# Patient Record
Sex: MALE | Race: ASIAN | NOT HISPANIC OR LATINO | URBAN - METROPOLITAN AREA
[De-identification: names, ages, dates, MRNs, and addresses within clinical notes are randomized per-mention and may not be internally consistent; named-entity substitution may affect disease eponyms.]

---

## 2022-01-01 ENCOUNTER — HOSPITAL ENCOUNTER (OUTPATIENT)
Facility: HOSPITAL | Age: 67
End: 2022-10-05
Attending: EMERGENCY MEDICINE | Admitting: FAMILY MEDICINE

## 2022-01-01 VITALS
BODY MASS INDEX: 22.1 KG/M2 | HEIGHT: 65 IN | HEART RATE: 83 BPM | DIASTOLIC BLOOD PRESSURE: 38 MMHG | SYSTOLIC BLOOD PRESSURE: 57 MMHG | WEIGHT: 132.63 LBS | RESPIRATION RATE: 31 BRPM | TEMPERATURE: 92 F | OXYGEN SATURATION: 89 %

## 2022-01-01 DIAGNOSIS — R07.9 CHEST PAIN: ICD-10-CM

## 2022-01-01 DIAGNOSIS — R73.9 HYPERGLYCEMIA, UNSPECIFIED: ICD-10-CM

## 2022-01-01 DIAGNOSIS — I21.3 STEMI (ST ELEVATION MYOCARDIAL INFARCTION): ICD-10-CM

## 2022-01-01 DIAGNOSIS — I21.4 NSTEMI (NON-ST ELEVATED MYOCARDIAL INFARCTION): Primary | ICD-10-CM

## 2022-01-01 DIAGNOSIS — J96.01 ACUTE RESPIRATORY FAILURE WITH HYPOXIA AND HYPERCARBIA: ICD-10-CM

## 2022-01-01 DIAGNOSIS — I50.21 ACUTE SYSTOLIC HEART FAILURE: ICD-10-CM

## 2022-01-01 DIAGNOSIS — I50.9 ACUTE CONGESTIVE HEART FAILURE, UNSPECIFIED HEART FAILURE TYPE: ICD-10-CM

## 2022-01-01 DIAGNOSIS — I46.9 CARDIAC ARREST: ICD-10-CM

## 2022-01-01 DIAGNOSIS — J96.02 ACUTE RESPIRATORY FAILURE WITH HYPOXIA AND HYPERCARBIA: ICD-10-CM

## 2022-01-01 LAB
ALBUMIN SERPL BCP-MCNC: 3.6 G/DL (ref 3.5–5.2)
ALLENS TEST: ABNORMAL
ALLENS TEST: ABNORMAL
ALP SERPL-CCNC: 83 U/L (ref 55–135)
ALT SERPL W/O P-5'-P-CCNC: 50 U/L (ref 10–44)
ANION GAP SERPL CALC-SCNC: 14 MMOL/L (ref 8–16)
APTT BLDCRRT: 139 SEC (ref 21–32)
AST SERPL-CCNC: 90 U/L (ref 10–40)
BACTERIA #/AREA URNS HPF: ABNORMAL /HPF
BASOPHILS # BLD AUTO: 0.04 K/UL (ref 0–0.2)
BASOPHILS # BLD AUTO: 0.04 K/UL (ref 0–0.2)
BASOPHILS NFR BLD: 0.3 % (ref 0–1.9)
BASOPHILS NFR BLD: 0.4 % (ref 0–1.9)
BILIRUB SERPL-MCNC: 0.2 MG/DL (ref 0.1–1)
BILIRUB UR QL STRIP: NEGATIVE
BNP SERPL-MCNC: 429 PG/ML (ref 0–99)
BUN SERPL-MCNC: 19 MG/DL (ref 8–23)
CALCIUM SERPL-MCNC: 8.8 MG/DL (ref 8.7–10.5)
CHLORIDE SERPL-SCNC: 104 MMOL/L (ref 95–110)
CHOLEST SERPL-MCNC: 178 MG/DL (ref 120–199)
CHOLEST/HDLC SERPL: 4.6 {RATIO} (ref 2–5)
CLARITY UR: ABNORMAL
CO2 SERPL-SCNC: 18 MMOL/L (ref 23–29)
COLOR UR: YELLOW
CREAT SERPL-MCNC: 0.8 MG/DL (ref 0.5–1.4)
CTP QC/QA: YES
DELSYS: ABNORMAL
DELSYS: ABNORMAL
DIFFERENTIAL METHOD: ABNORMAL
DIFFERENTIAL METHOD: ABNORMAL
EOSINOPHIL # BLD AUTO: 0 K/UL (ref 0–0.5)
EOSINOPHIL # BLD AUTO: 0.1 K/UL (ref 0–0.5)
EOSINOPHIL NFR BLD: 0.3 % (ref 0–8)
EOSINOPHIL NFR BLD: 0.8 % (ref 0–8)
ERYTHROCYTE [DISTWIDTH] IN BLOOD BY AUTOMATED COUNT: 13.7 % (ref 11.5–14.5)
ERYTHROCYTE [DISTWIDTH] IN BLOOD BY AUTOMATED COUNT: 13.8 % (ref 11.5–14.5)
ERYTHROCYTE [SEDIMENTATION RATE] IN BLOOD BY WESTERGREN METHOD: 18 MM/H
ERYTHROCYTE [SEDIMENTATION RATE] IN BLOOD BY WESTERGREN METHOD: 24 MM/H
EST. GFR  (NO RACE VARIABLE): >60 ML/MIN/1.73 M^2
FIO2: 100
FIO2: 75
GLUCOSE SERPL-MCNC: 188 MG/DL (ref 70–110)
GLUCOSE SERPL-MCNC: 352 MG/DL (ref 70–110)
GLUCOSE SERPL-MCNC: 445 MG/DL (ref 70–110)
GLUCOSE UR QL STRIP: ABNORMAL
HCO3 UR-SCNC: 10.1 MMOL/L (ref 24–28)
HCO3 UR-SCNC: 20.7 MMOL/L (ref 24–28)
HCT VFR BLD AUTO: 43.7 % (ref 40–54)
HCT VFR BLD AUTO: 44 % (ref 40–54)
HCT VFR BLD CALC: 39 %PCV (ref 36–54)
HCT VFR BLD CALC: 48 %PCV (ref 36–54)
HDLC SERPL-MCNC: 39 MG/DL (ref 40–75)
HDLC SERPL: 21.9 % (ref 20–50)
HGB BLD-MCNC: 14.5 G/DL (ref 14–18)
HGB BLD-MCNC: 14.7 G/DL (ref 14–18)
HGB UR QL STRIP: NEGATIVE
HYALINE CASTS #/AREA URNS LPF: 1 /LPF
IMM GRANULOCYTES # BLD AUTO: 0.05 K/UL (ref 0–0.04)
IMM GRANULOCYTES # BLD AUTO: 0.06 K/UL (ref 0–0.04)
IMM GRANULOCYTES NFR BLD AUTO: 0.5 % (ref 0–0.5)
IMM GRANULOCYTES NFR BLD AUTO: 0.5 % (ref 0–0.5)
INR PPP: 1.1 (ref 0.8–1.2)
KETONES UR QL STRIP: NEGATIVE
LDLC SERPL CALC-MCNC: 116.8 MG/DL (ref 63–159)
LEUKOCYTE ESTERASE UR QL STRIP: NEGATIVE
LYMPHOCYTES # BLD AUTO: 1.3 K/UL (ref 1–4.8)
LYMPHOCYTES # BLD AUTO: 1.6 K/UL (ref 1–4.8)
LYMPHOCYTES NFR BLD: 10.8 % (ref 18–48)
LYMPHOCYTES NFR BLD: 14 % (ref 18–48)
MCH RBC QN AUTO: 30.3 PG (ref 27–31)
MCH RBC QN AUTO: 30.4 PG (ref 27–31)
MCHC RBC AUTO-ENTMCNC: 33.2 G/DL (ref 32–36)
MCHC RBC AUTO-ENTMCNC: 33.4 G/DL (ref 32–36)
MCV RBC AUTO: 91 FL (ref 82–98)
MCV RBC AUTO: 91 FL (ref 82–98)
MICROSCOPIC COMMENT: ABNORMAL
MODE: ABNORMAL
MODE: ABNORMAL
MONOCYTES # BLD AUTO: 0.3 K/UL (ref 0.3–1)
MONOCYTES # BLD AUTO: 0.4 K/UL (ref 0.3–1)
MONOCYTES NFR BLD: 2.6 % (ref 4–15)
MONOCYTES NFR BLD: 3.4 % (ref 4–15)
NEUTROPHILS # BLD AUTO: 10.4 K/UL (ref 1.8–7.7)
NEUTROPHILS # BLD AUTO: 8.9 K/UL (ref 1.8–7.7)
NEUTROPHILS NFR BLD: 80.9 % (ref 38–73)
NEUTROPHILS NFR BLD: 85.5 % (ref 38–73)
NITRITE UR QL STRIP: NEGATIVE
NONHDLC SERPL-MCNC: 139 MG/DL
NRBC BLD-RTO: 0 /100 WBC
NRBC BLD-RTO: 0 /100 WBC
PCO2 BLDA: 65.3 MMHG (ref 35–45)
PCO2 BLDA: 65.5 MMHG (ref 35–45)
PEEP: 5
PEEP: 5
PH SMN: 6.8 [PH] (ref 7.35–7.45)
PH SMN: 7.11 [PH] (ref 7.35–7.45)
PH UR STRIP: 6 [PH] (ref 5–8)
PLATELET # BLD AUTO: 189 K/UL (ref 150–450)
PLATELET # BLD AUTO: 216 K/UL (ref 150–450)
PMV BLD AUTO: 10 FL (ref 9.2–12.9)
PMV BLD AUTO: 10.3 FL (ref 9.2–12.9)
PO2 BLDA: 79 MMHG (ref 80–100)
PO2 BLDA: 87 MMHG (ref 80–100)
POC BE: -24 MMOL/L
POC BE: -9 MMOL/L
POC IONIZED CALCIUM: 0.97 MMOL/L (ref 1.06–1.42)
POC IONIZED CALCIUM: 1.25 MMOL/L (ref 1.06–1.42)
POC SATURATED O2: 82 % (ref 95–100)
POC SATURATED O2: 89 % (ref 95–100)
POTASSIUM BLD-SCNC: 3.7 MMOL/L (ref 3.5–5.1)
POTASSIUM BLD-SCNC: 4.3 MMOL/L (ref 3.5–5.1)
POTASSIUM SERPL-SCNC: 3.3 MMOL/L (ref 3.5–5.1)
PROT SERPL-MCNC: 7.3 G/DL (ref 6–8.4)
PROT UR QL STRIP: ABNORMAL
PROTHROMBIN TIME: 11.9 SEC (ref 9–12.5)
RBC # BLD AUTO: 4.79 M/UL (ref 4.6–6.2)
RBC # BLD AUTO: 4.84 M/UL (ref 4.6–6.2)
RBC #/AREA URNS HPF: 21 /HPF (ref 0–4)
SAMPLE: ABNORMAL
SAMPLE: ABNORMAL
SARS-COV-2 RDRP RESP QL NAA+PROBE: NEGATIVE
SITE: ABNORMAL
SITE: ABNORMAL
SODIUM BLD-SCNC: 135 MMOL/L (ref 136–145)
SODIUM BLD-SCNC: 145 MMOL/L (ref 136–145)
SODIUM SERPL-SCNC: 136 MMOL/L (ref 136–145)
SP GR UR STRIP: 1.02 (ref 1–1.03)
TRIGL SERPL-MCNC: 111 MG/DL (ref 30–150)
TROPONIN I SERPL DL<=0.01 NG/ML-MCNC: 0.59 NG/ML (ref 0–0.03)
UNIDENT CRYS URNS QL MICRO: ABNORMAL
URN SPEC COLLECT METH UR: ABNORMAL
UROBILINOGEN UR STRIP-ACNC: ABNORMAL EU/DL
VT: 400
VT: 400
WBC # BLD AUTO: 11.04 K/UL (ref 3.9–12.7)
WBC # BLD AUTO: 12.17 K/UL (ref 3.9–12.7)
WBC #/AREA URNS HPF: 5 /HPF (ref 0–5)

## 2022-01-01 PROCEDURE — 63600175 PHARM REV CODE 636 W HCPCS: Performed by: NURSE PRACTITIONER

## 2022-01-01 PROCEDURE — 36620 INSERTION CATHETER ARTERY: CPT | Mod: 59,,, | Performed by: NURSE PRACTITIONER

## 2022-01-01 PROCEDURE — 81000 URINALYSIS NONAUTO W/SCOPE: CPT | Performed by: FAMILY MEDICINE

## 2022-01-01 PROCEDURE — 85610 PROTHROMBIN TIME: CPT | Performed by: EMERGENCY MEDICINE

## 2022-01-01 PROCEDURE — 84484 ASSAY OF TROPONIN QUANT: CPT | Performed by: EMERGENCY MEDICINE

## 2022-01-01 PROCEDURE — 27100108

## 2022-01-01 PROCEDURE — 99291 PR CRITICAL CARE, E/M 30-74 MINUTES: ICD-10-PCS | Mod: 25,,, | Performed by: NURSE PRACTITIONER

## 2022-01-01 PROCEDURE — 93010 EKG 12-LEAD: ICD-10-PCS | Mod: 76,,, | Performed by: INTERNAL MEDICINE

## 2022-01-01 PROCEDURE — 99291 CRITICAL CARE FIRST HOUR: CPT | Mod: 25,,, | Performed by: NURSE PRACTITIONER

## 2022-01-01 PROCEDURE — 83880 ASSAY OF NATRIURETIC PEPTIDE: CPT | Performed by: EMERGENCY MEDICINE

## 2022-01-01 PROCEDURE — 36415 COLL VENOUS BLD VENIPUNCTURE: CPT | Performed by: EMERGENCY MEDICINE

## 2022-01-01 PROCEDURE — 99499 NO LOS: ICD-10-PCS | Mod: ,,, | Performed by: INTERNAL MEDICINE

## 2022-01-01 PROCEDURE — 82803 BLOOD GASES ANY COMBINATION: CPT

## 2022-01-01 PROCEDURE — 51702 INSERT TEMP BLADDER CATH: CPT

## 2022-01-01 PROCEDURE — 63600175 PHARM REV CODE 636 W HCPCS: Performed by: FAMILY MEDICINE

## 2022-01-01 PROCEDURE — 80061 LIPID PANEL: CPT | Performed by: EMERGENCY MEDICINE

## 2022-01-01 PROCEDURE — 25000003 PHARM REV CODE 250: Performed by: FAMILY MEDICINE

## 2022-01-01 PROCEDURE — 25000003 PHARM REV CODE 250

## 2022-01-01 PROCEDURE — 93010 ELECTROCARDIOGRAM REPORT: CPT | Mod: ,,, | Performed by: INTERNAL MEDICINE

## 2022-01-01 PROCEDURE — 27200966 HC CLOSED SUCTION SYSTEM

## 2022-01-01 PROCEDURE — 87635 SARS-COV-2 COVID-19 AMP PRB: CPT | Performed by: FAMILY MEDICINE

## 2022-01-01 PROCEDURE — 36556 INSERT NON-TUNNEL CV CATH: CPT | Mod: ,,, | Performed by: NURSE PRACTITIONER

## 2022-01-01 PROCEDURE — G0378 HOSPITAL OBSERVATION PER HR: HCPCS

## 2022-01-01 PROCEDURE — 63600175 PHARM REV CODE 636 W HCPCS: Performed by: EMERGENCY MEDICINE

## 2022-01-01 PROCEDURE — 25000003 PHARM REV CODE 250: Performed by: NURSE PRACTITIONER

## 2022-01-01 PROCEDURE — 99499 UNLISTED E&M SERVICE: CPT | Mod: ,,, | Performed by: INTERNAL MEDICINE

## 2022-01-01 PROCEDURE — 25000003 PHARM REV CODE 250: Performed by: EMERGENCY MEDICINE

## 2022-01-01 PROCEDURE — 80053 COMPREHEN METABOLIC PANEL: CPT | Performed by: EMERGENCY MEDICINE

## 2022-01-01 PROCEDURE — 94002 VENT MGMT INPAT INIT DAY: CPT

## 2022-01-01 PROCEDURE — 36620 PR INSERT CATH,ART,PERCUT,SHORTTERM: ICD-10-PCS | Mod: 59,,, | Performed by: NURSE PRACTITIONER

## 2022-01-01 PROCEDURE — 36556 PR INSERT NON-TUNNEL CV CATH 5+ YRS OLD: ICD-10-PCS | Mod: ,,, | Performed by: NURSE PRACTITIONER

## 2022-01-01 PROCEDURE — 92950 HEART/LUNG RESUSCITATION CPR: CPT

## 2022-01-01 PROCEDURE — 96366 THER/PROPH/DIAG IV INF ADDON: CPT

## 2022-01-01 PROCEDURE — 99900035 HC TECH TIME PER 15 MIN (STAT)

## 2022-01-01 PROCEDURE — 36600 WITHDRAWAL OF ARTERIAL BLOOD: CPT

## 2022-01-01 PROCEDURE — 85025 COMPLETE CBC W/AUTO DIFF WBC: CPT | Mod: 91 | Performed by: EMERGENCY MEDICINE

## 2022-01-01 PROCEDURE — 93005 ELECTROCARDIOGRAM TRACING: CPT

## 2022-01-01 PROCEDURE — 85730 THROMBOPLASTIN TIME PARTIAL: CPT | Performed by: EMERGENCY MEDICINE

## 2022-01-01 PROCEDURE — 99291 CRITICAL CARE FIRST HOUR: CPT | Mod: 25

## 2022-01-01 PROCEDURE — 96365 THER/PROPH/DIAG IV INF INIT: CPT | Mod: 59

## 2022-01-01 PROCEDURE — 96374 THER/PROPH/DIAG INJ IV PUSH: CPT

## 2022-01-01 PROCEDURE — 27100080 HC AIRWAY ADAPTER-END TIDAL CO2

## 2022-01-01 PROCEDURE — 93010 ELECTROCARDIOGRAM REPORT: CPT | Mod: 76,,, | Performed by: INTERNAL MEDICINE

## 2022-01-01 PROCEDURE — 31500 INSERT EMERGENCY AIRWAY: CPT

## 2022-01-01 RX ORDER — FAMOTIDINE 10 MG/ML
20 INJECTION INTRAVENOUS 2 TIMES DAILY
Status: DISCONTINUED | OUTPATIENT
Start: 2022-01-01 | End: 2022-01-01 | Stop reason: HOSPADM

## 2022-01-01 RX ORDER — FUROSEMIDE 10 MG/ML
20 INJECTION INTRAMUSCULAR; INTRAVENOUS ONCE
Status: DISCONTINUED | OUTPATIENT
Start: 2022-01-01 | End: 2022-01-01 | Stop reason: HOSPADM

## 2022-01-01 RX ORDER — NOREPINEPHRINE BITARTRATE/D5W 4MG/250ML
PLASTIC BAG, INJECTION (ML) INTRAVENOUS
Status: DISCONTINUED
Start: 2022-01-01 | End: 2022-01-01 | Stop reason: HOSPADM

## 2022-01-01 RX ORDER — EPINEPHRINE 0.1 MG/ML
INJECTION INTRAVENOUS CODE/TRAUMA/SEDATION MEDICATION
Status: COMPLETED | OUTPATIENT
Start: 2022-01-01 | End: 2022-01-01

## 2022-01-01 RX ORDER — POTASSIUM CHLORIDE 20 MEQ/1
40 TABLET, EXTENDED RELEASE ORAL ONCE
Status: DISCONTINUED | OUTPATIENT
Start: 2022-01-01 | End: 2022-01-01 | Stop reason: HOSPADM

## 2022-01-01 RX ORDER — MORPHINE SULFATE 4 MG/ML
2 INJECTION, SOLUTION INTRAMUSCULAR; INTRAVENOUS EVERY 6 HOURS PRN
Status: DISCONTINUED | OUTPATIENT
Start: 2022-01-01 | End: 2022-01-01 | Stop reason: HOSPADM

## 2022-01-01 RX ORDER — INDOMETHACIN 25 MG/1
CAPSULE ORAL
Status: COMPLETED
Start: 2022-01-01 | End: 2022-01-01

## 2022-01-01 RX ORDER — EPINEPHRINE 0.1 MG/ML
INJECTION INTRAVENOUS CODE/TRAUMA/SEDATION MEDICATION
Status: DISCONTINUED | OUTPATIENT
Start: 2022-01-01 | End: 2022-01-01 | Stop reason: HOSPADM

## 2022-01-01 RX ORDER — AMIODARONE HYDROCHLORIDE 150 MG/3ML
INJECTION, SOLUTION INTRAVENOUS CODE/TRAUMA/SEDATION MEDICATION
Status: DISCONTINUED | OUTPATIENT
Start: 2022-01-01 | End: 2022-01-01 | Stop reason: HOSPADM

## 2022-01-01 RX ORDER — HEPARIN SODIUM 5000 [USP'U]/ML
5000 INJECTION, SOLUTION INTRAVENOUS; SUBCUTANEOUS
Status: COMPLETED | OUTPATIENT
Start: 2022-01-01 | End: 2022-01-01

## 2022-01-01 RX ORDER — INDOMETHACIN 25 MG/1
100 CAPSULE ORAL ONCE
Status: COMPLETED | OUTPATIENT
Start: 2022-01-01 | End: 2022-01-01

## 2022-01-01 RX ORDER — HEPARIN SODIUM,PORCINE/D5W 25000/250
0-40 INTRAVENOUS SOLUTION INTRAVENOUS CONTINUOUS
Status: DISCONTINUED | OUTPATIENT
Start: 2022-01-01 | End: 2022-01-01 | Stop reason: HOSPADM

## 2022-01-01 RX ORDER — SODIUM CHLORIDE 0.9 % (FLUSH) 0.9 %
10 SYRINGE (ML) INJECTION
Status: DISCONTINUED | OUTPATIENT
Start: 2022-01-01 | End: 2022-01-01 | Stop reason: HOSPADM

## 2022-01-01 RX ORDER — ONDANSETRON 4 MG/1
4 TABLET, FILM COATED ORAL EVERY 6 HOURS PRN
Status: DISCONTINUED | OUTPATIENT
Start: 2022-01-01 | End: 2022-01-01 | Stop reason: HOSPADM

## 2022-01-01 RX ORDER — PROPOFOL 10 MG/ML
0-50 INJECTION, EMULSION INTRAVENOUS CONTINUOUS
Status: DISCONTINUED | OUTPATIENT
Start: 2022-01-01 | End: 2022-01-01 | Stop reason: HOSPADM

## 2022-01-01 RX ORDER — ASPIRIN 325 MG
325 TABLET ORAL
Status: COMPLETED | OUTPATIENT
Start: 2022-01-01 | End: 2022-01-01

## 2022-01-01 RX ORDER — SODIUM BICARBONATE 1 MEQ/ML
SYRINGE (ML) INTRAVENOUS CODE/TRAUMA/SEDATION MEDICATION
Status: COMPLETED | OUTPATIENT
Start: 2022-01-01 | End: 2022-01-01

## 2022-01-01 RX ORDER — ACETAMINOPHEN 325 MG/1
650 TABLET ORAL EVERY 6 HOURS PRN
Status: DISCONTINUED | OUTPATIENT
Start: 2022-01-01 | End: 2022-01-01 | Stop reason: HOSPADM

## 2022-01-01 RX ORDER — SODIUM BICARBONATE 1 MEQ/ML
SYRINGE (ML) INTRAVENOUS CODE/TRAUMA/SEDATION MEDICATION
Status: DISCONTINUED | OUTPATIENT
Start: 2022-01-01 | End: 2022-01-01 | Stop reason: HOSPADM

## 2022-01-01 RX ORDER — NOREPINEPHRINE BITARTRATE/D5W 4MG/250ML
0-3 PLASTIC BAG, INJECTION (ML) INTRAVENOUS CONTINUOUS
Status: DISCONTINUED | OUTPATIENT
Start: 2022-01-01 | End: 2022-01-01 | Stop reason: HOSPADM

## 2022-01-01 RX ORDER — CHLORHEXIDINE GLUCONATE ORAL RINSE 1.2 MG/ML
15 SOLUTION DENTAL 2 TIMES DAILY
Status: DISCONTINUED | OUTPATIENT
Start: 2022-01-01 | End: 2022-01-01 | Stop reason: HOSPADM

## 2022-01-01 RX ORDER — MORPHINE SULFATE 4 MG/ML
2 INJECTION, SOLUTION INTRAMUSCULAR; INTRAVENOUS
Status: COMPLETED | OUTPATIENT
Start: 2022-01-01 | End: 2022-01-01

## 2022-01-01 RX ADMIN — EPINEPHRINE 1 MG: 0.1 INJECTION, SOLUTION ENDOTRACHEAL; INTRACARDIAC; INTRAVENOUS at 06:10

## 2022-01-01 RX ADMIN — SODIUM BICARBONATE 50 MEQ: 84 INJECTION, SOLUTION INTRAVENOUS at 07:10

## 2022-01-01 RX ADMIN — INDOMETHACIN 100 MEQ: 25 CAPSULE ORAL at 06:10

## 2022-01-01 RX ADMIN — SODIUM CHLORIDE 1000 ML: 0.9 INJECTION, SOLUTION INTRAVENOUS at 06:10

## 2022-01-01 RX ADMIN — SODIUM BICARBONATE 100 MEQ: 84 INJECTION, SOLUTION INTRAVENOUS at 07:10

## 2022-01-01 RX ADMIN — HEPARIN SODIUM AND DEXTROSE 12 UNITS/KG/HR: 10000; 5 INJECTION INTRAVENOUS at 03:10

## 2022-01-01 RX ADMIN — EPINEPHRINE 0.1 MG: 0.1 INJECTION, SOLUTION ENDOTRACHEAL; INTRACARDIAC; INTRAVENOUS at 05:10

## 2022-01-01 RX ADMIN — PROPOFOL 5 MCG/KG/MIN: 10 INJECTION, EMULSION INTRAVENOUS at 06:10

## 2022-01-01 RX ADMIN — INDOMETHACIN 100 MEQ: 25 CAPSULE ORAL at 07:10

## 2022-01-01 RX ADMIN — EPINEPHRINE 1 MG: 0.1 INJECTION, SOLUTION ENDOTRACHEAL; INTRACARDIAC; INTRAVENOUS at 07:10

## 2022-01-01 RX ADMIN — MORPHINE SULFATE 2 MG: 4 INJECTION INTRAVENOUS at 02:10

## 2022-01-01 RX ADMIN — SODIUM BICARBONATE 100 MEQ: 84 INJECTION, SOLUTION INTRAVENOUS at 06:10

## 2022-01-01 RX ADMIN — HEPARIN SODIUM 5000 UNITS: 5000 INJECTION, SOLUTION INTRAVENOUS; SUBCUTANEOUS at 02:10

## 2022-01-01 RX ADMIN — ASPIRIN 325 MG ORAL TABLET 325 MG: 325 PILL ORAL at 02:10

## 2022-01-01 RX ADMIN — INSULIN HUMAN 10 UNITS: 100 INJECTION, SOLUTION PARENTERAL at 07:10

## 2022-01-01 RX ADMIN — HEPARIN SODIUM AND DEXTROSE 12 UNITS/KG/HR: 10000; 5 INJECTION INTRAVENOUS at 06:10

## 2022-01-01 RX ADMIN — SODIUM BICARBONATE 50 MEQ: 84 INJECTION, SOLUTION INTRAVENOUS at 06:10

## 2022-01-01 RX ADMIN — AMIODARONE HYDROCHLORIDE 150 MG: 150 INJECTION, SOLUTION INTRAVENOUS at 07:10

## 2022-10-05 PROBLEM — I46.9 CARDIAC ARREST: Status: ACTIVE | Noted: 2022-01-01

## 2022-10-05 PROBLEM — R73.9 HYPERGLYCEMIA, UNSPECIFIED: Status: ACTIVE | Noted: 2022-01-01

## 2022-10-05 PROBLEM — E87.6 HYPOKALEMIA: Status: ACTIVE | Noted: 2022-01-01

## 2022-10-05 PROBLEM — J96.02 ACUTE RESPIRATORY FAILURE WITH HYPOXIA AND HYPERCARBIA: Status: ACTIVE | Noted: 2022-01-01

## 2022-10-05 PROBLEM — I50.21 ACUTE SYSTOLIC HEART FAILURE: Status: ACTIVE | Noted: 2022-01-01

## 2022-10-05 PROBLEM — J96.01 ACUTE RESPIRATORY FAILURE WITH HYPOXIA AND HYPERCARBIA: Status: ACTIVE | Noted: 2022-01-01

## 2022-10-05 PROBLEM — I21.4 NSTEMI (NON-ST ELEVATED MYOCARDIAL INFARCTION): Status: ACTIVE | Noted: 2022-01-01

## 2022-10-05 PROBLEM — R79.89 ELEVATED BRAIN NATRIURETIC PEPTIDE (BNP) LEVEL: Status: ACTIVE | Noted: 2022-01-01

## 2022-10-05 NOTE — HOSPITAL COURSE
Patient was admitted for nstemi. He subsequently went into cardiac arrest on the floor. Patient was intubated and transferred to ICU. Patient was coded for approximately 45-50 min. He had multiple episodes of ROSC however eventually became unresponsive to epi pushes. Unable to contact family. A friend called patient's phone who stated that patient is  however does not know her name or number. Patient does not have any children. Due to futility, decision was made to not escalate care further.     Patient was examined at bedside. No spontaneous breath/cardiac sounds auscultated. Pupils fixed and dilated. He did not withdraw to pain. PEA on monitor. Patient was pronounced at 0737 on 10/5/2022.

## 2022-10-05 NOTE — PROCEDURES
"Morgan Alanis Do is a 66 y.o. male patient.    Temp: (!) 92.3 °F (33.5 °C) (10/05/22 0724)  Pulse: 83 (10/05/22 0724)  Resp: (!) 31 (10/05/22 0724)  BP: (!) 57/38 (10/05/22 0724)  SpO2: (!) 89 % (10/05/22 0724)  Weight: 60.1 kg (132 lb 9.6 oz) (10/05/22 0254)  Height: 5' 5" (165.1 cm) (10/05/22 0254)       Arterial Line    Date/Time: 10/5/2022 6:50 AM  Location procedure was performed: [facility] INTENSIVE CARE UNIT  Performed by: Luciano Newell NP  Authorized by: Luciano Newell NP   Pre-op Diagnosis: cardiac arrest  Post-operative diagnosis: cardiac arrest  Consent Done: Emergent Situation  Preparation: Patient was prepped and draped in the usual sterile fashion.  Indications: multiple ABGs, respiratory failure and hemodynamic monitoring  Location: right femoral    Patient sedated: no  Needle gauge: 5F.  Seldinger technique: Seldinger technique used  Number of attempts: 1  Complications: No  Estimated blood loss (mL): 1  Specimens: No  Implants: No  Post-procedure: line sutured and dressing applied  Post-procedure CMS: unchanged  Patient tolerance: Patient tolerated the procedure well with no immediate complications        10/5/2022    "

## 2022-10-05 NOTE — CONSULTS
[facility] - Intensive Care ([facility])  Critical Care Medicine  Consult Note    Patient Name: Morgan Alanis Do  MRN: [id number]  Admission Date: 10/5/2022  Hospital Length of Stay: 0 days  Code Status: Full Code  Attending Physician: Giovanny Berumen MD   Primary Care Provider: No primary care provider on file.   Principal Problem: NSTEMI (non-ST elevated myocardial infarction)    [unfilled]  Subjective:     HPI:  Mr Lynch is a Turkish male age 65 yo with unknown PMH not listed on any home meds presented to [facility] ED about 0250 hr this AM via EMS with complaint of left sided CP which onset suddenly around 10:00PM prior evening. Associated sxs included SOB, weakness, and fatigue. Patient denied any n/v/d, fever, numbness, cough, and all other sxs at this time.  Hypothermic in ED with troponin 0.6 and  with pulm edema on CXR and admitted to Tele for NSTEMI.  Cards consulted started on Heparin infusion.  On Tele had cardiac arrest and intubated then transferred to ICU post ROSC about 0600 hr this AM.      Hospital/ICU Course:  10/5 - I arrived this AM to work at 0630 and at that time patient had another Cardiac arrest going in and out of ROSC with repeated arrest refractory to CPR, Epi X 8, Bicarb X 3 and Amiodarone X 1.  He had short runs of V-Tach with NSR but would not support BP with Levophed infusion, IVF bolus and multiple Epi Inj.  Bedside cardiac Echo per myself revealed severe WMA.  Code run by myself and Dr. Berumen then Dr. Owen arrived about 0650 and assisted with repeated codes lasting 45 minutes but unable to obtain SBP > 40 with ACLS meds.  Emergent right femoral Arterial and CL placed see procedure notes.  Code was eventually called at 0714 per Dr. Owen and Dr. Berumen.      History reviewed. No pertinent past medical history.    History reviewed. No pertinent surgical history.    Review of patient's allergies indicates:  No Known Allergies    Family History    None       Tobacco Use    Smoking status: Not on file     Smokeless tobacco: Not on file   Substance and Sexual Activity    Alcohol use: Not on file    Drug use: Not on file    Sexual activity: Not on file         Review of Systems   Unable to perform ROS: Intubated   Objective:     Vital Signs (Most Recent):  Temp: (!) 92.3 °F (33.5 °C) (10/05/22 0724)  Pulse: 83 (10/05/22 0724)  Resp: (!) 31 (10/05/22 0724)  BP: (!) 57/38 (10/05/22 0724)  SpO2: (!) 89 % (10/05/22 0724)   Vital Signs (24h Range):  Temp:  [92.3 °F (33.5 °C)-98.3 °F (36.8 °C)] 92.3 °F (33.5 °C)  Pulse:  [] 83  Resp:  [18-40] 31  SpO2:  [82 %-100 %] 89 %  BP: ()/(38-99) 57/38  Arterial Line BP: (92)/(77) 92/77     Weight: 60.1 kg (132 lb 9.6 oz)  Body mass index is 22.07 kg/m².    No intake or output data in the 24 hours ending 10/05/22 0817    Physical Exam  Vitals and nursing note reviewed.   Constitutional:       General: He is not in acute distress.     Appearance: He is well-developed and normal weight. He is ill-appearing and toxic-appearing. He is not diaphoretic.      Interventions: He is intubated and restrained.   HENT:      Head: Normocephalic and atraumatic.      Mouth/Throat:      Mouth: Mucous membranes are moist.   Eyes:      General: Lids are normal.      Pupils:      Right eye: Pupil is sluggish.      Left eye: Pupil is sluggish.   Neck:      Trachea: Trachea normal.   Cardiovascular:      Rate and Rhythm: Normal rate. Rhythm irregular.      Pulses:           Radial pulses are 0 on the right side and 0 on the left side.        Dorsalis pedis pulses are 0 on the right side and 0 on the left side.      Heart sounds: Heart sounds are distant. Murmur heard.   Systolic murmur is present.   Pulmonary:      Effort: Pulmonary effort is normal. No tachypnea, accessory muscle usage or respiratory distress. He is intubated.      Breath sounds: Decreased breath sounds and rales present.   Chest:      Chest wall: No deformity.   Abdominal:      General: Bowel sounds are absent. There is no  distension.      Palpations: Abdomen is soft.      Tenderness: There is no abdominal tenderness.   Genitourinary:     Penis: Normal.       Comments: Donovan in place  Musculoskeletal:         General: Normal range of motion.      Cervical back: Neck supple.      Right lower leg: No edema.      Left lower leg: No edema.      Right foot: No deformity.      Left foot: No deformity.   Lymphadenopathy:      Cervical: No cervical adenopathy.   Skin:     General: Skin is warm and dry.      Capillary Refill: Capillary refill takes more than 3 seconds.      Findings: No rash.          Neurological:      Comments: Comatose with cardiac arrest       Vents:  Vent Mode: A/C (10/05/22 0724)  Ventilator Initiated: Yes (10/05/22 0545)  Set Rate: 30 BPM (10/05/22 0724)  Vt Set: 430 mL (10/05/22 0724)  PEEP/CPAP: 5 cmH20 (10/05/22 0724)  Oxygen Concentration (%): 100 (10/05/22 0655)  Peak Airway Pressure: 20 cmH2O (10/05/22 0724)  Total Ve: 11.4 mL (10/05/22 0724)  F/VT Ratio<105 (RSBI): (!) 81.15 (10/05/22 0724)    Lines/Drains/Airways       Drain  Duration                  NG/OG Tube 10/05/22 0608 Center mouth <1 day         Urethral Catheter 10/05/22 0607 <1 day              Airway  Duration                  Airway - Non-Surgical 10/05/22 0545 Endotracheal Tube <1 day              Peripheral Intravenous Line  Duration                  Peripheral IV - Single Lumen 10/05/22 0253 18 G Left Antecubital <1 day         Peripheral IV - Single Lumen 10/05/22 0253 18 G Right Antecubital <1 day                    Significant Labs:    CBC/Anemia Profile:  Recent Labs   Lab 10/05/22  0247 10/05/22  0441 10/05/22  0604 10/05/22  0652   WBC 11.04 12.17  --   --    HGB 14.5 14.7  --   --    HCT 43.7 44.0 48 39    216  --   --    MCV 91 91  --   --    RDW 13.7 13.8  --   --         Chemistries:  Recent Labs   Lab 10/05/22  0247      K 3.3*      CO2 18*   BUN 19   CREATININE 0.8   ALBUMIN 3.6   BILITOT 0.2   ALKPHOS 83   ALT 50*    AST 90*       ABGs:   Recent Labs   Lab 10/05/22  0652   PH 7.107*   PCO2 65.5*   HCO3 20.7*   POCSATURATED 89*   BE -9     Troponin:   Recent Labs   Lab 10/05/22  0247   TROPONINI 0.588*     Urine Studies:   Recent Labs   Lab 10/05/22  0610   COLORU Yellow   APPEARANCEUA Hazy*   PHUR 6.0   SPECGRAV 1.025   PROTEINUA 3+*   GLUCUA Trace*   KETONESU Negative   BILIRUBINUA Negative   OCCULTUA Negative   NITRITE Negative   UROBILINOGEN 2.0-3.0*   LEUKOCYTESUR Negative   RBCUA 21*   WBCUA 5   BACTERIA Moderate*   HYALINECASTS 1     All pertinent labs within the past 24 hours have been reviewed.    Significant Imaging:   I have reviewed all pertinent imaging results/findings within the past 24 hours.  CXR: I have reviewed all pertinent results/findings within the past 24 hours and my personal findings are:  diffuse bilat pulm edema      ABG  Recent Labs   Lab 10/05/22  0652   PH 7.107*   PO2 79*   PCO2 65.5*   HCO3 20.7*   BE -9     Assessment/Plan:     Problem   Cardiac Arrest   Nstemi (Non-St Elevated Myocardial Infarction)   Acute Respiratory Failure With Hypoxia and Hypercarbia   Hyperglycemia, Unspecified   Acute Systolic Heart Failure   Hypokalemia     PLAN:   I arrived this AM to work at 0630 and at that time patient had another Cardiac arrest going in and out of ROSC with repeated arrest refractory to CPR, Epi X 8, Bicarb X 3 and Amiodarone X 1.  He had short runs of V-Tach with NSR but would not support BP with Levophed infusion, IVF bolus and multiple Epi Inj.  Bedside cardiac Echo per myself revealed severe WMA.  Code run by myself and Dr. Berumen then Dr. Owen arrived about 0650 and assisted with repeated codes lasting 45 minutes but unable to obtain SBP > 40 with ACLS meds.  Emergent right femoral Arterial and CL placed see procedure notes.  Code was eventually called at 0714 per Dr. Owen and Dr. Berumen.      Critical Care Time: 45 minutes  Critical secondary to Patient has a condition that poses threat to life  and bodily function: Acute Myocardial Infarction and Cardiac Arrest and Resp Failure intubated on [facility] ventilation  Patient is currently on drug therapy requiring intensive monitoring for toxicity: Levophed infusion and ACLS meds of multiple Epi injections     Critical care was time spent personally by me on the following activities: development of treatment plan with patient or surrogate and bedside caregivers, discussions with consultants, evaluation of patient's response to treatment, examination of patient, ordering and performing treatments and interventions, ordering and review of laboratory studies, ordering and review of radiographic studies, pulse oximetry, re-evaluation of patient's condition. This critical care time did not overlap with that of any other provider or involve time for any procedures.    Thank you for your consult. I will sign off. Please contact us if you have any additional questions.     Luciano Newell NP  Critical Care Medicine  '[facility] - Intensive Care [facility])

## 2022-10-05 NOTE — DISCHARGE SUMMARY
[facility] - Intensive Care ([facility])  [facility] Medicine  Discharge Summary      Patient Name: Morgan Alanis Do  MRN: [id number]  Patient Class: OP- Observation  Admission Date: 10/5/2022  Hospital Length of Stay: 0 days  Discharge Date and Time: TOD 0737 on 10/5/2022  Attending Physician: Giovanny Berumen MD   Discharging Provider: Giovanny Berumen MD  Primary Care Provider: No primary care provider on file.      HPI:   Patient is a 66 y.o. Ukrainian male with no reported PMH who presents to the Emergency Department for evaluation of left sided CP which onset suddenly around 10:00PM.  Patient's that the pain is substernal and constant without any alleviating or exacerbating factors.  Associated symptoms include shortness of breath and generalized fatigue.  Denies ever having similar symptoms in the past.  Denies any other issues.    In the ED, code STEMI was initial call.  Cardiology evaluated EKG and stated that it was not a STEMI.  Troponin 0.58.  .  Heparin drip was recommended.  Chest x-ray was concerning for pulmonary edema.  Patient was placed in observation for NSTEMI.      Procedure(s) (LRB):  CATHETERIZATION, HEART, LEFT (Left)      Hospital Course:   Patient was admitted for nstemi. He subsequently went into cardiac arrest on the floor. Patient was intubated and transferred to ICU. Patient was coded for approximately 45-50 min. He had multiple episodes of ROSC however eventually became unresponsive to epi pushes. Unable to contact family. A friend called patient's phone who stated that patient is  however does not know her name or number. Patient does not have any children. Due to futility, decision was made to not escalate care further.     Patient was examined at bedside. No spontaneous breath/cardiac sounds auscultated. Pupils fixed and dilated. He did not withdraw to pain. PEA on monitor. Patient was pronounced at 0737 on 10/5/2022.              Goals of Care Treatment Preferences:  Code Status: Full  Code      Consults:   Consults (From admission, onward)        Status Ordering Provider     Inpatient consult to Registered Dietitian/Nutritionist  Once        Provider:  (Not yet assigned)    Ordered ANA BERUMEN     Inpatient consult to Cardiology  Once        Provider:  (Not yet assigned)    Acknowledged ANA BERUMEN     Inpatient consult to Hospitalist  Once        Provider:  Ana Berumen MD    Acknowledged CESAR BALDERRAMA          No new Assessment & Plan notes have been filed under this hospital service since the last note was generated.  Service: Hospital Medicine    Final Active Diagnoses:    Diagnosis Date Noted POA    PRINCIPAL PROBLEM:  NSTEMI (non-ST elevated myocardial infarction) [I21.4] 10/05/2022 Yes    Elevated brain natriuretic peptide (BNP) level [R79.89] 10/05/2022 Yes    Hypokalemia [E87.6] 10/05/2022 Yes      Problems Resolved During this Admission:       Discharged Condition:     Disposition:     Follow Up:    Patient Instructions:   No discharge procedures on file.    Significant Diagnostic Studies:     Pending Diagnostic Studies:     Procedure Component Value Units Date/Time    Echo Saline Bubble? No [[id number]] Resulted: 10/05/22 0609    Order Status: Sent Lab Status: In process Updated: 10/05/22 0609    Troponin I #2 [[id number]] Collected: 10/05/22 0247    Order Status: Sent Lab Status: In process Updated: 10/05/22 0251    Specimen: Blood     Urinalysis, Reflex to Urine Culture Urine, Clean Catch [[id number]] Collected: 10/05/22 0610    Order Status: Sent Lab Status: In process Updated: 10/05/22 0736    Specimen: Urine          Medications:  None    Indwelling Lines/Drains at time of discharge:   Lines/Drains/Airways     Drain  Duration                NG/OG Tube 10/05/22 0608 Center mouth <1 day         Urethral Catheter 10/05/22 0607 <1 day          Airway  Duration                Airway - Non-Surgical 10/05/22 0545 Endotracheal Tube <1 day                Time spent on the discharge of patient:  38 minutes    Critical care time spent on the evaluation and treatment of severe organ dysfunction, review of pertinent labs and imaging studies, discussions with consulting providers and discussions with patient/family: 38 minutes.     Giovanny Berumen MD  Department of Hospital Medicine  [facility] - Intensive Care [facility])

## 2022-10-05 NOTE — H&P
[facility] - Emergency Dept.  [facility] Medicine  History & Physical    Patient Name: Morgan Alanis Do  MRN: [id number]  Patient Class: OP- Observation  Admission Date: 10/5/2022  Attending Physician: Giovanny Berumen MD  Primary Care Provider: No primary care provider on file.         Patient information was obtained from patient and ER records.     Subjective:     Principal Problem:NSTEMI (non-ST elevated myocardial infarction)    Chief Complaint:   Chief Complaint   Patient presents with    Chest Pain     CP with SOB x2 hours. Denies n/v or pain radiating.        HPI: Patient is a 66 y.o. Bengali male with no reported PMH who presents to the Emergency Department for evaluation of left sided CP which onset suddenly around 10:00PM.  Patient's that the pain is substernal and constant without any alleviating or exacerbating factors.  Associated symptoms include shortness of breath and generalized fatigue.  Denies ever having similar symptoms in the past.  Denies any other issues.    In the ED, code STEMI was initial call.  Cardiology evaluated EKG and stated that it was not a STEMI.  Troponin 0.58.  .  Heparin drip was recommended.  Chest x-ray was concerning for pulmonary edema.  Patient was placed in observation for NSTEMI.      No past medical history on file.    No past surgical history on file.    Review of patient's allergies indicates:  No Known Allergies    No current facility-administered medications on file prior to encounter.     No current outpatient medications on file prior to encounter.     Family History    None       Tobacco Use    Smoking status: Not on file    Smokeless tobacco: Not on file   Substance and Sexual Activity    Alcohol use: Not on file    Drug use: Not on file    Sexual activity: Not on file     Review of Systems   Constitutional:  Negative for fatigue and fever.   HENT:  Negative for sinus pressure.    Eyes:  Negative for visual disturbance.   Respiratory:  Negative for shortness of  breath.    Cardiovascular:  Positive for chest pain. Negative for palpitations.   Gastrointestinal:  Negative for nausea and vomiting.   Genitourinary:  Negative for difficulty urinating.   Musculoskeletal:  Negative for back pain.   Skin:  Negative for rash.   Neurological:  Negative for headaches.   Psychiatric/Behavioral:  Negative for confusion.    Objective:     Vital Signs (Most Recent):  Temp: 98.3 °F (36.8 °C) (10/05/22 0157)  Pulse: 92 (10/05/22 0402)  Resp: (!) 22 (10/05/22 0402)  BP: (!) 87/66 (10/05/22 0402)  SpO2: 97 % (10/05/22 0402)   Vital Signs (24h Range):  Temp:  [98.3 °F (36.8 °C)] 98.3 °F (36.8 °C)  Pulse:  [92-95] 92  Resp:  [18-22] 22  SpO2:  [92 %-98 %] 97 %  BP: ()/(64-70) 87/66     Weight: 60.1 kg (132 lb 9.6 oz)  Body mass index is 22.07 kg/m².    Physical Exam  Constitutional:       General: He is not in acute distress.     Appearance: He is well-developed. He is not diaphoretic.   HENT:      Head: Normocephalic and atraumatic.   Eyes:      Pupils: Pupils are equal, round, and reactive to light.   Cardiovascular:      Rate and Rhythm: Normal rate and regular rhythm.      Heart sounds: Normal heart sounds. No murmur heard.    No friction rub. No gallop.   Pulmonary:      Effort: Pulmonary effort is normal. No respiratory distress.      Breath sounds: No stridor. Rales present. No wheezing.   Abdominal:      General: Bowel sounds are normal. There is no distension.      Palpations: Abdomen is soft. There is no mass.      Tenderness: There is no abdominal tenderness. There is no guarding.   Musculoskeletal:      Right lower leg: No edema.      Left lower leg: No edema.   Skin:     General: Skin is warm.      Findings: No erythema.   Neurological:      Mental Status: He is alert and oriented to person, place, and time.         CRANIAL NERVES     CN III, IV, VI   Pupils are equal, round, and reactive to light.     Significant Labs:   Results for orders placed or performed during the  hospital encounter of 10/05/22   CBC auto differential   Result Value Ref Range    WBC 11.04 3.90 - 12.70 K/uL    RBC 4.79 4.60 - 6.20 M/uL    Hemoglobin 14.5 14.0 - 18.0 g/dL    Hematocrit 43.7 40.0 - 54.0 %    MCV 91 82 - 98 fL    MCH 30.3 27.0 - 31.0 pg    MCHC 33.2 32.0 - 36.0 g/dL    RDW 13.7 11.5 - 14.5 %    Platelets 189 150 - 450 K/uL    MPV 10.0 9.2 - 12.9 fL    Immature Granulocytes 0.5 0.0 - 0.5 %    Gran # (ANC) 8.9 (H) 1.8 - 7.7 K/uL    Immature Grans (Abs) 0.05 (H) 0.00 - 0.04 K/uL    Lymph # 1.6 1.0 - 4.8 K/uL    Mono # 0.4 0.3 - 1.0 K/uL    Eos # 0.1 0.0 - 0.5 K/uL    Baso # 0.04 0.00 - 0.20 K/uL    nRBC 0 0 /100 WBC    Gran % 80.9 (H) 38.0 - 73.0 %    Lymph % 14.0 (L) 18.0 - 48.0 %    Mono % 3.4 (L) 4.0 - 15.0 %    Eosinophil % 0.8 0.0 - 8.0 %    Basophil % 0.4 0.0 - 1.9 %    Differential Method Automated    Comprehensive metabolic panel   Result Value Ref Range    Sodium 136 136 - 145 mmol/L    Potassium 3.3 (L) 3.5 - 5.1 mmol/L    Chloride 104 95 - 110 mmol/L    CO2 18 (L) 23 - 29 mmol/L    Glucose 188 (H) 70 - 110 mg/dL    BUN 19 10 - 30 mg/dL    Creatinine 0.8 0.5 - 1.4 mg/dL    Albumin 3.6 3.5 - 5.2 g/dL    Total Bilirubin 0.2 0.1 - 1.0 mg/dL    Alkaline Phosphatase 83 55 - 135 U/L    AST 90 (H) 10 - 40 U/L    ALT 50 (H) 10 - 44 U/L    Anion Gap 14 8 - 16 mmol/L    eGFR >60 >60 mL/min/1.73 m^2   Troponin I #1   Result Value Ref Range    Troponin I 0.588 (H) 0.000 - 0.026 ng/mL   BNP   Result Value Ref Range     (H) 0 - 99 pg/mL   APTT   Result Value Ref Range    aPTT 139.0 (H) 21.0 - 32.0 sec   Protime-INR   Result Value Ref Range    Prothrombin Time 11.9 9.0 - 12.5 sec    INR 1.1 0.8 - 1.2   CBC auto differential   Result Value Ref Range    WBC 12.17 3.90 - 12.70 K/uL    RBC 4.84 4.60 - 6.20 M/uL    Hemoglobin 14.7 14.0 - 18.0 g/dL    Hematocrit 44.0 40.0 - 54.0 %    MCV 91 82 - 98 fL    MCH 30.4 27.0 - 31.0 pg    MCHC 33.4 32.0 - 36.0 g/dL    RDW 13.8 11.5 - 14.5 %    Platelets 216 150  - 450 K/uL    MPV 10.3 9.2 - 12.9 fL    Immature Granulocytes 0.5 0.0 - 0.5 %    Gran # (ANC) 10.4 (H) 1.8 - 7.7 K/uL    Immature Grans (Abs) 0.06 (H) 0.00 - 0.04 K/uL    Lymph # 1.3 1.0 - 4.8 K/uL    Mono # 0.3 0.3 - 1.0 K/uL    Eos # 0.0 0.0 - 0.5 K/uL    Baso # 0.04 0.00 - 0.20 K/uL    nRBC 0 0 /100 WBC    Gran % 85.5 (H) 38.0 - 73.0 %    Lymph % 10.8 (L) 18.0 - 48.0 %    Mono % 2.6 (L) 4.0 - 15.0 %    Eosinophil % 0.3 0.0 - 8.0 %    Basophil % 0.3 0.0 - 1.9 %    Differential Method Automated         Significant Imaging: No image results found.      Assessment/Plan:     * NSTEMI (non-ST elevated myocardial infarction)  Chest pain, elevated troponin   Heparin drip recommended by cardiology   continue on heparin drip   Keep NPO   Echo  Trend troponins   Morphine p.r.n. pain  Aspirin given      Hypokalemia  Replete      Elevated brain natriuretic peptide (BNP) level  Chest x-ray indicative of pulmonary edema  BNP elevated   Will give Lasix 20 mg once  Monitor for blood pressure   EKG pending      VTE Risk Mitigation (From admission, onward)         Ordered     heparin 25,000 units in dextrose 5% (100 units/ml) IV bolus from bag - ADDITIONAL PRN BOLUS - 30 units/kg (max bolus 4000 units)  As needed (PRN)        Question:  Heparin Infusion Adjustment (DO NOT MODIFY ANSWER)  Answer:  \[email or web address]\[email or web address]\Images\Pharmacy\HeparinInfusions\heparin LOW INTENSITY nomogram for [facility] [id number]    10/05/22 0311     heparin 25,000 units in dextrose 5% (100 units/ml) IV bolus from bag - ADDITIONAL PRN BOLUS - 60 units/kg (max bolus 4000 units)  As needed (PRN)        Question:  Heparin Infusion Adjustment (DO NOT MODIFY ANSWER)  Answer:  \[email or web address]\epic\Images\Pharmacy\HeparinInfusions\heparin LOW INTENSITY nomogram for [facility] [id number].pdf    10/05/22 0311     IP VTE HIGH RISK PATIENT  Once         10/05/22 0402     Place sequential compression device  Until discontinued         10/05/22 0402     heparin 25,000 units in dextrose 5%  250 mL (100 units/mL) infusion LOW INTENSITY nomogram - [facility]  Continuous        Question Answer Comment   Heparin Infusion Adjustment (DO NOT MODIFY ANSWER) [email or web address]\Images\Pharmacy\HeparinInfusions\heparin LOW INTENSITY nomogram for [facility] [id number].pdf    Begin at (in units/kg/hr) 12        10/05/22 0311                   Giovanny Berumen MD  Department of Hospital Medicine   '[facility] - Emergency Dept.

## 2022-10-05 NOTE — SUBJECTIVE & OBJECTIVE
No past medical history on file.    No past surgical history on file.    Review of patient's allergies indicates:  No Known Allergies    No current facility-administered medications on file prior to encounter.     No current outpatient medications on file prior to encounter.     Family History    None       Tobacco Use    Smoking status: Not on file    Smokeless tobacco: Not on file   Substance and Sexual Activity    Alcohol use: Not on file    Drug use: Not on file    Sexual activity: Not on file     Review of Systems   Constitutional:  Negative for fatigue and fever.   HENT:  Negative for sinus pressure.    Eyes:  Negative for visual disturbance.   Respiratory:  Negative for shortness of breath.    Cardiovascular:  Positive for chest pain. Negative for palpitations.   Gastrointestinal:  Negative for nausea and vomiting.   Genitourinary:  Negative for difficulty urinating.   Musculoskeletal:  Negative for back pain.   Skin:  Negative for rash.   Neurological:  Negative for headaches.   Psychiatric/Behavioral:  Negative for confusion.    Objective:     Vital Signs (Most Recent):  Temp: 98.3 °F (36.8 °C) (10/05/22 0157)  Pulse: 92 (10/05/22 0402)  Resp: (!) 22 (10/05/22 0402)  BP: (!) 87/66 (10/05/22 0402)  SpO2: 97 % (10/05/22 0402)   Vital Signs (24h Range):  Temp:  [98.3 °F (36.8 °C)] 98.3 °F (36.8 °C)  Pulse:  [92-95] 92  Resp:  [18-22] 22  SpO2:  [92 %-98 %] 97 %  BP: ()/(64-70) 87/66     Weight: 60.1 kg (132 lb 9.6 oz)  Body mass index is 22.07 kg/m².    Physical Exam  Constitutional:       General: He is not in acute distress.     Appearance: He is well-developed. He is not diaphoretic.   HENT:      Head: Normocephalic and atraumatic.   Eyes:      Pupils: Pupils are equal, round, and reactive to light.   Cardiovascular:      Rate and Rhythm: Normal rate and regular rhythm.      Heart sounds: Normal heart sounds. No murmur heard.    No friction rub. No gallop.   Pulmonary:      Effort: Pulmonary effort is  normal. No respiratory distress.      Breath sounds: No stridor. Rales present. No wheezing.   Abdominal:      General: Bowel sounds are normal. There is no distension.      Palpations: Abdomen is soft. There is no mass.      Tenderness: There is no abdominal tenderness. There is no guarding.   Musculoskeletal:      Right lower leg: No edema.      Left lower leg: No edema.   Skin:     General: Skin is warm.      Findings: No erythema.   Neurological:      Mental Status: He is alert and oriented to person, place, and time.         CRANIAL NERVES     CN III, IV, VI   Pupils are equal, round, and reactive to light.     Significant Labs:   Results for orders placed or performed during the hospital encounter of 10/05/22   CBC auto differential   Result Value Ref Range    WBC 11.04 3.90 - 12.70 K/uL    RBC 4.79 4.60 - 6.20 M/uL    Hemoglobin 14.5 14.0 - 18.0 g/dL    Hematocrit 43.7 40.0 - 54.0 %    MCV 91 82 - 98 fL    MCH 30.3 27.0 - 31.0 pg    MCHC 33.2 32.0 - 36.0 g/dL    RDW 13.7 11.5 - 14.5 %    Platelets 189 150 - 450 K/uL    MPV 10.0 9.2 - 12.9 fL    Immature Granulocytes 0.5 0.0 - 0.5 %    Gran # (ANC) 8.9 (H) 1.8 - 7.7 K/uL    Immature Grans (Abs) 0.05 (H) 0.00 - 0.04 K/uL    Lymph # 1.6 1.0 - 4.8 K/uL    Mono # 0.4 0.3 - 1.0 K/uL    Eos # 0.1 0.0 - 0.5 K/uL    Baso # 0.04 0.00 - 0.20 K/uL    nRBC 0 0 /100 WBC    Gran % 80.9 (H) 38.0 - 73.0 %    Lymph % 14.0 (L) 18.0 - 48.0 %    Mono % 3.4 (L) 4.0 - 15.0 %    Eosinophil % 0.8 0.0 - 8.0 %    Basophil % 0.4 0.0 - 1.9 %    Differential Method Automated    Comprehensive metabolic panel   Result Value Ref Range    Sodium 136 136 - 145 mmol/L    Potassium 3.3 (L) 3.5 - 5.1 mmol/L    Chloride 104 95 - 110 mmol/L    CO2 18 (L) 23 - 29 mmol/L    Glucose 188 (H) 70 - 110 mg/dL    BUN 19 10 - 30 mg/dL    Creatinine 0.8 0.5 - 1.4 mg/dL    Albumin 3.6 3.5 - 5.2 g/dL    Total Bilirubin 0.2 0.1 - 1.0 mg/dL    Alkaline Phosphatase 83 55 - 135 U/L    AST 90 (H) 10 - 40 U/L    ALT  50 (H) 10 - 44 U/L    Anion Gap 14 8 - 16 mmol/L    eGFR >60 >60 mL/min/1.73 m^2   Troponin I #1   Result Value Ref Range    Troponin I 0.588 (H) 0.000 - 0.026 ng/mL   BNP   Result Value Ref Range     (H) 0 - 99 pg/mL   APTT   Result Value Ref Range    aPTT 139.0 (H) 21.0 - 32.0 sec   Protime-INR   Result Value Ref Range    Prothrombin Time 11.9 9.0 - 12.5 sec    INR 1.1 0.8 - 1.2   CBC auto differential   Result Value Ref Range    WBC 12.17 3.90 - 12.70 K/uL    RBC 4.84 4.60 - 6.20 M/uL    Hemoglobin 14.7 14.0 - 18.0 g/dL    Hematocrit 44.0 40.0 - 54.0 %    MCV 91 82 - 98 fL    MCH 30.4 27.0 - 31.0 pg    MCHC 33.4 32.0 - 36.0 g/dL    RDW 13.8 11.5 - 14.5 %    Platelets 216 150 - 450 K/uL    MPV 10.3 9.2 - 12.9 fL    Immature Granulocytes 0.5 0.0 - 0.5 %    Gran # (ANC) 10.4 (H) 1.8 - 7.7 K/uL    Immature Grans (Abs) 0.06 (H) 0.00 - 0.04 K/uL    Lymph # 1.3 1.0 - 4.8 K/uL    Mono # 0.3 0.3 - 1.0 K/uL    Eos # 0.0 0.0 - 0.5 K/uL    Baso # 0.04 0.00 - 0.20 K/uL    nRBC 0 0 /100 WBC    Gran % 85.5 (H) 38.0 - 73.0 %    Lymph % 10.8 (L) 18.0 - 48.0 %    Mono % 2.6 (L) 4.0 - 15.0 %    Eosinophil % 0.3 0.0 - 8.0 %    Basophil % 0.3 0.0 - 1.9 %    Differential Method Automated         Significant Imaging: No image results found.

## 2022-10-05 NOTE — ASSESSMENT & PLAN NOTE
Chest pain, elevated troponin   Heparin drip recommended by cardiology   continue on heparin drip   Keep NPO   Echo  Trend troponins   Morphine p.r.n. pain  Aspirin given

## 2022-10-05 NOTE — NURSING
This RN arrived to unit for shift while pt was actively coding; this RN participated in code per ACLS protocol; code was called by MD after approx 45 minutes and TOFRITZ was called shortly after

## 2022-10-05 NOTE — ASSESSMENT & PLAN NOTE
Chest x-ray indicative of pulmonary edema  BNP elevated   Will give Lasix 20 mg once  Monitor for blood pressure   EKG pending

## 2022-10-05 NOTE — HPI
Mr Lynch is a [place] male age 67 yo with unknown PMH not listed on any home meds presented to [facility] ED about 0250 hr this AM via EMS with complaint of left sided CP which onset suddenly around 10:00PM prior evening. Associated sxs included SOB, weakness, and fatigue. Patient denied any n/v/d, fever, numbness, cough, and all other sxs at this time.  Hypothermic in ED with troponin 0.6 and  with pulm edema on CXR and admitted to Tele for NSTEMI.  Cards consulted started on Heparin infusion.  On Tele had cardiac arrest and intubated then transferred to ICU post ROSC about 0600 hr this AM.

## 2022-10-05 NOTE — ED PROVIDER NOTES
SCRIBE #1 NOTE: I, Azeem Granda, am scribing for, and in the presence of, Ana Sung MD. I have scribed the entire note.       History     Chief Complaint   Patient presents with    Chest Pain     CP with SOB x2 hours. Denies n/v or pain radiating.     Review of patient's allergies indicates:  No Known Allergies      History of Present Illness     HPI    10/5/2022, 2:50 AM  History obtained from the patient      History of Present Illness: Morgan Alanis Do is a 66 y.o. male patient who presents to the Emergency Department for evaluation of left sided CP which onset suddenly around 10:00PM. Symptoms are constant and moderate in severity. No mitigating or exacerbating factors reported. Associated sxs include SOB, weakness, and fatigue. Patient denies any n/v/d, fever, numbness, cough, and all other sxs at this time. No further complaints or concerns at this time.       Arrival mode: Ambulance Service    PCP: No primary care provider on file.        Past Medical History:  History reviewed. No pertinent past medical history.    Past Surgical History:  History reviewed. No pertinent surgical history.      Family History:  History reviewed. No pertinent family history.    Social History:  Social History     Tobacco Use    Smoking status: Not on file    Smokeless tobacco: Not on file   Substance and Sexual Activity    Alcohol use: Not on file    Drug use: Not on file    Sexual activity: Not on file        Review of Systems     Review of Systems   Constitutional:  Positive for fatigue. Negative for chills and fever.   HENT:  Negative for sore throat.    Respiratory:  Positive for shortness of breath. Negative for cough.    Cardiovascular:  Positive for chest pain (L side).   Gastrointestinal:  Negative for diarrhea, nausea and vomiting.   Genitourinary:  Negative for dysuria.   Musculoskeletal:  Negative for back pain.   Skin:  Negative for rash.   Neurological:  Positive for weakness. Negative for numbness.   Hematological:   Does not bruise/bleed easily.   All other systems reviewed and are negative.     Physical Exam     Initial Vitals [10/05/22 0157]   BP Pulse Resp Temp SpO2   102/70 94 18 98.3 °F (36.8 °C) 98 %      MAP       --          Physical Exam  Nursing Notes and Vital Signs Reviewed.  Constitutional: Patient is in mild distress. Well-developed and well-nourished.  Head: Atraumatic. Normocephalic.  Eyes: PERRL. EOM intact. Conjunctivae are not pale. No scleral icterus.  ENT: Mucous membranes are moist. Oropharynx is clear and symmetric.    Neck: Supple. Full ROM. No lymphadenopathy.  Cardiovascular: Regular rate. Regular rhythm. No murmurs, rubs, or gallops. Distal pulses are 2+ and symmetric.  Pulmonary/Chest: Tachypnic. Clear to auscultation bilaterally. No wheezing or rales.  Abdominal: Soft and non-distended.  There is no tenderness.  No rebound, guarding, or rigidity.   Musculoskeletal: Moves all extremities. No obvious deformities. No edema. No calf tenderness.  Skin: Warm and dry.  Neurological:  Alert, awake, and appropriate.  Normal speech.  No acute focal neurological deficits are appreciated.  Psychiatric: Normal affect. Good eye contact. Appropriate in content.     ED Course   Critical Care    Date/Time: 10/5/2022 3:57 AM  Performed by: Ana Sung MD  Authorized by: Ana Sung MD   Direct patient critical care time: 10 minutes  Additional history critical care time: 5 minutes  Ordering / reviewing critical care time: 10 minutes  Documentation critical care time: 5 minutes  Consulting other physicians critical care time: 5 minutes  Total critical care time (exclusive of procedural time) : 35 minutes  Critical care time was exclusive of separately billable procedures and treating other patients and teaching time.  Critical care was necessary to treat or prevent imminent or life-threatening deterioration of the following conditions: cardiac failure (NSTEMI).  Critical care was time spent personally by me on the  following activities: blood draw for specimens, development of treatment plan with patient or surrogate, discussions with consultants, interpretation of cardiac output measurements, evaluation of patient's response to treatment, obtaining history from patient or surrogate, examination of patient, ordering and performing treatments and interventions, ordering and review of laboratory studies, ordering and review of radiographic studies, pulse oximetry, re-evaluation of patient's condition and review of old charts.      ED Vital Signs:  Vitals:    10/05/22 0545 10/05/22 0555 10/05/22 0600 10/05/22 0605   BP:  (!) 116/91  (!) 151/75   Pulse: (!) 120 (!) 137 106 102   Resp: (!) 28 (!) 27 (!) 28 (!) 29   Temp:    (!) 95.7 °F (35.4 °C)   TempSrc:       SpO2:  96% (!) 87% (!) 86%   Weight:       Height:        10/05/22 0610 10/05/22 0615 10/05/22 0620 10/05/22 0625   BP:  (!) 86/63 96/66 99/69   Pulse: 99 105 (!) 124 (!) 126   Resp: (!) 28 (!) 30 (!) 29 (!) 30   Temp: (!) 95.7 °F (35.4 °C) (!) 95.7 °F (35.4 °C) (!) 95.4 °F (35.2 °C) (!) 95.2 °F (35.1 °C)   TempSrc:       SpO2: (!) 89% (!) 91% 99% 99%   Weight:       Height:        10/05/22 0630 10/05/22 0635 10/05/22 0640 10/05/22 0645   BP:  106/73 96/70 (!) 71/56   Pulse: (!) 139 (!) 129 (!) 142 (!) 123   Resp: (!) 25 (!) 24 (!) 27 (!) 31   Temp: (!) 95 °F (35 °C) (!) 94.3 °F (34.6 °C) (!) 94.1 °F (34.5 °C) (!) 94.3 °F (34.6 °C)   TempSrc:       SpO2: 100% (!) 82% 100% 98%   Weight:       Height:        10/05/22 0650 10/05/22 0701 10/05/22 0724   BP: (!) 143/51  (!) 57/38   Pulse: (!) 132 (!) 127 83   Resp: (!) 40  (!) 31   Temp: (!) 94.1 °F (34.5 °C)  (!) 92.3 °F (33.5 °C)   TempSrc:      SpO2: (!) 87%  (!) 89%   Weight:      Height:          Abnormal Lab Results:  Labs Reviewed   CBC W/ AUTO DIFFERENTIAL - Abnormal; Notable for the following components:       Result Value    Gran # (ANC) 8.9 (*)     Immature Grans (Abs) 0.05 (*)     Gran % 80.9 (*)     Lymph % 14.0 (*)      Mono % 3.4 (*)     All other components within normal limits   TROPONIN I - Abnormal; Notable for the following components:    Troponin I 0.588 (*)     All other components within normal limits   B-TYPE NATRIURETIC PEPTIDE - Abnormal; Notable for the following components:     (*)     All other components within normal limits   APTT - Abnormal; Notable for the following components:    aPTT 139.0 (*)     All other components within normal limits    Narrative:     Draw baseline aPTT prior to starting the heparin bolus or  infusion  (if patient is on warfarin prior to heparin therapy)   PROTIME-INR    Narrative:     Draw baseline aPTT prior to starting the heparin bolus or  infusion  (if patient is on warfarin prior to heparin therapy)   TROPONIN I        All Lab Results:  Results for orders placed or performed during the hospital encounter of 10/05/22   CBC auto differential   Result Value Ref Range    WBC 11.04 3.90 - 12.70 K/uL    RBC 4.79 4.60 - 6.20 M/uL    Hemoglobin 14.5 14.0 - 18.0 g/dL    Hematocrit 43.7 40.0 - 54.0 %    MCV 91 82 - 98 fL    MCH 30.3 27.0 - 31.0 pg    MCHC 33.2 32.0 - 36.0 g/dL    RDW 13.7 11.5 - 14.5 %    Platelets 189 150 - 450 K/uL    MPV 10.0 9.2 - 12.9 fL    Immature Granulocytes 0.5 0.0 - 0.5 %    Gran # (ANC) 8.9 (H) 1.8 - 7.7 K/uL    Immature Grans (Abs) 0.05 (H) 0.00 - 0.04 K/uL    Lymph # 1.6 1.0 - 4.8 K/uL    Mono # 0.4 0.3 - 1.0 K/uL    Eos # 0.1 0.0 - 0.5 K/uL    Baso # 0.04 0.00 - 0.20 K/uL    nRBC 0 0 /100 WBC    Gran % 80.9 (H) 38.0 - 73.0 %    Lymph % 14.0 (L) 18.0 - 48.0 %    Mono % 3.4 (L) 4.0 - 15.0 %    Eosinophil % 0.8 0.0 - 8.0 %    Basophil % 0.4 0.0 - 1.9 %    Differential Method Automated    Comprehensive metabolic panel   Result Value Ref Range    Sodium 136 136 - 145 mmol/L    Potassium 3.3 (L) 3.5 - 5.1 mmol/L    Chloride 104 95 - 110 mmol/L    CO2 18 (L) 23 - 29 mmol/L    Glucose 188 (H) 70 - 110 mg/dL    BUN 19 8 - 23 mg/dL    Creatinine 0.8 0.5 - 1.4  mg/dL    Calcium 8.8 8.7 - 10.5 mg/dL    Total Protein 7.3 6.0 - 8.4 g/dL    Albumin 3.6 3.5 - 5.2 g/dL    Total Bilirubin 0.2 0.1 - 1.0 mg/dL    Alkaline Phosphatase 83 55 - 135 U/L    AST 90 (H) 10 - 40 U/L    ALT 50 (H) 10 - 44 U/L    Anion Gap 14 8 - 16 mmol/L    eGFR >60 >60 mL/min/1.73 m^2   Troponin I #1   Result Value Ref Range    Troponin I 0.588 (H) 0.000 - 0.026 ng/mL   BNP   Result Value Ref Range     (H) 0 - 99 pg/mL   APTT   Result Value Ref Range    aPTT 139.0 (H) 21.0 - 32.0 sec   Protime-INR   Result Value Ref Range    Prothrombin Time 11.9 9.0 - 12.5 sec    INR 1.1 0.8 - 1.2   CBC auto differential   Result Value Ref Range    WBC 12.17 3.90 - 12.70 K/uL    RBC 4.84 4.60 - 6.20 M/uL    Hemoglobin 14.7 14.0 - 18.0 g/dL    Hematocrit 44.0 40.0 - 54.0 %    MCV 91 82 - 98 fL    MCH 30.4 27.0 - 31.0 pg    MCHC 33.4 32.0 - 36.0 g/dL    RDW 13.8 11.5 - 14.5 %    Platelets 216 150 - 450 K/uL    MPV 10.3 9.2 - 12.9 fL    Immature Granulocytes 0.5 0.0 - 0.5 %    Gran # (ANC) 10.4 (H) 1.8 - 7.7 K/uL    Immature Grans (Abs) 0.06 (H) 0.00 - 0.04 K/uL    Lymph # 1.3 1.0 - 4.8 K/uL    Mono # 0.3 0.3 - 1.0 K/uL    Eos # 0.0 0.0 - 0.5 K/uL    Baso # 0.04 0.00 - 0.20 K/uL    nRBC 0 0 /100 WBC    Gran % 85.5 (H) 38.0 - 73.0 %    Lymph % 10.8 (L) 18.0 - 48.0 %    Mono % 2.6 (L) 4.0 - 15.0 %    Eosinophil % 0.3 0.0 - 8.0 %    Basophil % 0.3 0.0 - 1.9 %    Differential Method Automated    Urinalysis, Reflex to Urine Culture Urine, Clean Catch    Specimen: Urine   Result Value Ref Range    Specimen UA Urine, Catheterized     Color, UA Yellow Yellow, Straw, Arcelia    Appearance, UA Hazy (A) Clear    pH, UA 6.0 5.0 - 8.0    Specific Gravity, UA 1.025 1.005 - 1.030    Protein, UA 3+ (A) Negative    Glucose, UA Trace (A) Negative    Ketones, UA Negative Negative    Bilirubin (UA) Negative Negative    Occult Blood UA Negative Negative    Nitrite, UA Negative Negative    Urobilinogen, UA 2.0-3.0 (A) <2.0 EU/dL     Leukocytes, UA Negative Negative   Urinalysis Microscopic   Result Value Ref Range    RBC, UA 21 (H) 0 - 4 /hpf    WBC, UA 5 0 - 5 /hpf    Bacteria Moderate (A) None-Occ /hpf    Hyaline Casts, UA 1 0-1/lpf /lpf    Unclass Do UA Occasional None-Moderate    Microscopic Comment SEE COMMENT    Lipid Panel   Result Value Ref Range    Cholesterol 178 120 - 199 mg/dL    Triglycerides 111 30 - 150 mg/dL    HDL 39 (L) 40 - 75 mg/dL    LDL Cholesterol 116.8 63.0 - 159.0 mg/dL    HDL/Cholesterol Ratio 21.9 20.0 - 50.0 %    Total Cholesterol/HDL Ratio 4.6 2.0 - 5.0    Non-HDL Cholesterol 139 mg/dL   POCT COVID-19 Rapid Screening   Result Value Ref Range    POC Rapid COVID Negative Negative     Acceptable Yes    ISTAT PROCEDURE   Result Value Ref Range    POC PH 6.798 (LL) 7.35 - 7.45    POC PCO2 65.3 (HH) 35 - 45 mmHg    POC PO2 87 80 - 100 mmHg    POC HCO3 10.1 (L) 24 - 28 mmol/L    POC BE -24 -2 to 2 mmol/L    POC SATURATED O2 82 (L) 95 - 100 %    POC Glucose 352 (H) 70 - 110 mg/dL    POC Sodium 135 (L) 136 - 145 mmol/L    POC Potassium 4.3 3.5 - 5.1 mmol/L    POC Ionized Calcium 1.25 1.06 - 1.42 mmol/L    POC Hematocrit 48 36 - 54 %PCV    Rate 18     Sample ARTERIAL     Site LR     Allens Test Pass     DelSys Adult Vent     Mode AC/PRVC     Vt 400     PEEP 5     FiO2 100    ISTAT PROCEDURE   Result Value Ref Range    POC PH 7.107 (LL) 7.35 - 7.45    POC PCO2 65.5 (HH) 35 - 45 mmHg    POC PO2 79 (L) 80 - 100 mmHg    POC HCO3 20.7 (L) 24 - 28 mmol/L    POC BE -9 -2 to 2 mmol/L    POC SATURATED O2 89 (L) 95 - 100 %    POC Glucose 445 (H) 70 - 110 mg/dL    POC Sodium 145 136 - 145 mmol/L    POC Potassium 3.7 3.5 - 5.1 mmol/L    POC Ionized Calcium 0.97 (L) 1.06 - 1.42 mmol/L    POC Hematocrit 39 36 - 54 %PCV    Rate 24     Sample ARTERIAL     Site Jun/UAC     Allens Test N/A     DelSys Adult Vent     Mode AC/PRVC     Vt 400     PEEP 5     FiO2 75          Imaging Results:  Imaging Results              X-Ray  Chest AP Portable (Final result)  Result time 10/05/22 07:05:09      Final result by Gunnar Schmidt MD (10/05/22 07:05:09)                   Impression:      See above      Electronically signed by: Gunnar Schmidt MD  Date:    10/05/2022  Time:    07:05               Narrative:    EXAMINATION:  XR CHEST AP PORTABLE    CLINICAL HISTORY:  Chest Pain;    FINDINGS:  Single view of the chest.    No comparison    Cardiac silhouette is normal.  Diffuse interstitial opacities throughout the lungs without comparison.  Suspect diffuse interstitial pneumonia or interstitial edema.  No evidence of pleural effusion or pneumothorax.  Bones appear intact.  Aorta demonstrates atherosclerotic disease.                                     2:52 AM: Per ED physician, pt's CXR results: Pulmonary edema.    The EKG was ordered, reviewed, and independently interpreted by the ED provider.  Interpretation time: 0230  Rate: 95 BPM  Rhythm:  Sinus rhythm with premature supraventricular complexes  Interpretation: Biatrial enlargement. Left ventricular hypertrophy with QRS widening. Possible inferior infarct, age undetermined. Lateral injury pattern. Acute MI / STEMI.       The EKG was ordered, reviewed, and independently interpreted by the ED provider.  Interpretation time: 0231  Rate: 95 BPM  Rhythm: normal sinus rhythm  Interpretation: Biatrial enlargement. Left posterior fascicular block. Left ventricular hypertrophy with QRS widening. ST elevation consider lateral injury or acute infarct. Aute MI / STEMI.       The EKG was ordered, reviewed, and independently interpreted by the ED provider.  Interpretation time: 0304  Rate: 92 BPM  Rhythm: normal sinus rhythm  Interpretation: Left atrial enlargement. Nonspecific intraventricular block. Possible inferior infarct, age undetermined. Marked ST abnormality, possible anterior subendocardial injury. No STEMI.               The Emergency Provider reviewed the vital signs and test results, which are  outlined above.     ED Discussion     3:04 AM: Discussed pt's case with Dr. Simms (Cardiology) who recommends cancelling code STEMI.    3:52 AM: Discussed case with Shira Ramirez NP (Hospital Medicine). Dr. Berumen agrees with current care and management of pt and accepts admission.   Admitting Service: Hospital Medicine  Admitting Physician: Dr. Berumen  Admit to: Obs tele    3:52 AM: Re-evaluated pt. I have discussed test results, shared treatment plan, and the need for admission with patient and family at bedside. Pt and family express understanding at this time and agree with all information. All questions answered. Pt and family have no further questions or concerns at this time. Pt is ready for admit.         Medical Decision Making:   Clinical Tests:   Lab Tests: Ordered and Reviewed  Radiological Study: Ordered and Reviewed  Medical Tests: Ordered and Reviewed         ED Medication(s):  Medications   aspirin tablet 325 mg (325 mg Oral Given 10/5/22 0215)   morphine injection 2 mg (2 mg Intravenous Given 10/5/22 0259)   heparin (porcine) injection 5,000 Units (5,000 Units Intravenous Given 10/5/22 0250)   sodium bicarbonate solution 100 mEq ( Intravenous Canceled Entry 10/5/22 0715)   EPINEPHrine 0.1 mg/mL injection (1 mg Intravenous Given 10/5/22 0644)   insulin regular injection 10 Units 0.1 mL (10 Units Intravenous Given 10/5/22 0713)   sodium bicarbonate 8.4 % (1 mEq/mL) injection (50 mEq Intravenous Given 10/5/22 0630)   sodium bicarbonate solution 100 mEq (100 mEq Intravenous Given 10/5/22 0715)       There are no discharge medications for this patient.              Scribe Attestation:   Scribe #1: I performed the above scribed service and the documentation accurately describes the services I performed. I attest to the accuracy of the note.     Attending:   Physician Attestation Statement for Scribe #1: I, Ana Sung MD, personally performed the services described in this documentation, as scribed by  Azeem Granda, in my presence, and it is both accurate and complete.           Clinical Impression       ICD-10-CM ICD-9-CM   1. NSTEMI (non-ST elevated myocardial infarction)  I21.4 410.70   2. Chest pain  R07.9 786.50   3. STEMI (ST elevation myocardial infarction)  I21.3 410.90   4. Acute congestive heart failure, unspecified heart failure type  I50.9 428.0   5. Cardiac arrest  I46.9 427.5   6. Acute respiratory failure with hypoxia and hypercarbia  J96.01 518.81    J96.02    7. Acute systolic heart failure  I50.21 428.21   8. Hyperglycemia, unspecified  R73.9 790.29       Disposition:   Disposition: Placed in Observation  Condition: Fair       Ana Sung MD  10/06/22 0542

## 2022-10-05 NOTE — EICU
Rounding (Video Assessment):  Bedside    Intervention Initiated From:  Bedside    Jennifer Communicated with Bedside Nurse regarding:  Other    Nurse Notified:  No    Doctor Notified:  Yes Dr Lees    Comments: 0666 Ealert from bedside staff for code blue. See code flowsheet. Documentation from information provided by bedside staff    0693 Ealert from bedside staff for code blue. See code blue flowsheet. Documentation from information provided by bedside staff

## 2022-10-05 NOTE — CONSULTS
Cardiology note:    Pt presented to ER w CP, low BP, dyspnea.  ECGs x  2 reviewed, 0230, 0231.  Ecgs shows some artifact, sinus rhythm, LHV, only V6 has mild ST elevation, ST depression V2-V4 is noted.  ECG is not diagnostic of STEMI on my review.    Discussed w ER physician, Dr Sung.  Initiate medical therapy for ACS and stabilization of patient advised.  Pressor support for low BP as tolerated.  Lasix for pulm edema as tolerated as BP allows.  IV Heparin gtt.  Serial enzymes.  Admit to ICU.  Echocardiogram.    Dr Simms

## 2022-10-05 NOTE — NURSING
Pt arrived to unit via stretcher from ED. Pt was able to ambulate to bed from lee. Pt appeared to be very anxious. Pt situated in bed. On 2L NC. VS were taken. Pt breathing heavily. Able to calm pt. Resting quietly. Pt suddenly began vomiting small amount of clear emesis. Pt began sweating and became very pale. Charge nurse was called. Approximately 1-2 minutes after charge nurse arrived a rapid response was called. Pt was found to have no pulse. Code Blue was called immediately. Once code was over pt was transferred to ICU. Pt belongings, which included his wallet, cell phone and clothing, were brought to ICU room.

## 2022-10-05 NOTE — HPI
Patient is a 66 y.o. [place] male with no reported PMH who presents to the Emergency Department for evaluation of left sided CP which onset suddenly around 10:00PM.  Patient's that the pain is substernal and constant without any alleviating or exacerbating factors.  Associated symptoms include shortness of breath and generalized fatigue.  Denies ever having similar symptoms in the past.  Denies any other issues.    In the ED, code STEMI was initial call.  Cardiology evaluated EKG and stated that it was not a STEMI.  Troponin 0.58.  .  Heparin drip was recommended.  Chest x-ray was concerning for pulmonary edema.  Patient was placed in observation for NSTEMI.

## 2022-10-05 NOTE — PROCEDURES
"Morgan Alanis Do is a 66 y.o. male patient.    Temp: (!) 92.3 °F (33.5 °C) (10/05/22 0724)  Pulse: 83 (10/05/22 0724)  Resp: (!) 31 (10/05/22 0724)  BP: (!) 57/38 (10/05/22 0724)  SpO2: (!) 89 % (10/05/22 0724)  Weight: 60.1 kg (132 lb 9.6 oz) (10/05/22 0254)  Height: 5' 5" (165.1 cm) (10/05/22 0254)       Central Line    Date/Time: 10/5/2022 6:45 AM  Performed by: Luciano Newell NP  Authorized by: Luciano Newell NP     Location procedure was performed:  [facility] INTENSIVE CARE UNIT  Pre-operative diagnosis:  Cardiac arrest  Post-operative diagnosis:  Cardiac arrest  Consent Done ?:  Emergent Situation  Time out complete?: Verified correct patient, procedure, equipment, staff, and site/side    Indications:  Med administration  Preparation:  Skin prepped with ChloraPrep  Skin prep agent dried: Skin prep agent completely dried prior to procedure    Sterile barriers: All five maximal sterile barriers used - gloves, gown, cap, mask and large sterile sheet    Hand hygiene: Hand hygiene performed immediately prior to central venous catheter insertion    Location:  Right femoral  Site selection rationale:  Cardiac arrest  Catheter type:  Triple lumen  Catheter size:  7 Fr  Ultrasound guidance: Yes    Vessel Caliber:  Medium   patent  Comprressibility:  Normal  Doppler:  Not done  Needle advanced into vessel with real time ultrasound guidance.    Guidewire confirmed in vessel.    Steril sheath on probe.    Sterile gel used.  Manometry: No    Number of attempts:  1  Securement:  Line sutured, chlorhexidine patch, sterile dressing applied and blood return through all ports  Complications: No    Estimated blood loss (mL):  2  Specimens: No    Implants: No    XRay:  Successful placement  Adverse Events:  NoneTermination Site: inferior vena cava    10/5/2022    "

## 2022-10-05 NOTE — HOSPITAL COURSE
10/5 - I arrived this AM to work at 0630 and at that time patient had another Cardiac arrest going in and out of ROSC with repeated arrest refractory to CPR, Epi X 8, Bicarb X 3 and Amiodarone X 1.  He had short runs of V-Tach with NSR but would not support BP with Levophed infusion, IVF bolus and multiple Epi Inj.  Bedside cardiac Echo per myself revealed severe WMA.  Code run by myself and Dr. Berumen then Dr. Owen arrived about 0650 and assisted with repeated codes lasting 45 minutes but unable to obtain SBP > 40 with ACLS meds.  Emergent right femoral Arterial and CL placed see procedure notes.  Code was eventually called at 0714 per Dr. Owen and Dr. Berumen.

## 2022-10-05 NOTE — EICU
Intervention Initiated From:  Bedside    Jennifer Communicated with Bedside Nurse regarding:  Documentation    Comments: Called to bedside at 0655 for PEA arrest.  CPR initiated and ACLS protocol followed.  Epinephrine x 5, bicarb x 3, and amiodarone 150 mg given.  CODE called by MD Owen at 0713 due to medical futility.  See CODE flowsheet for full documentation.

## 2022-10-05 NOTE — PROCEDURES
"Morgan Alanis Do is a 66 y.o. male patient.    Temp: 97.9 °F (36.6 °C) (10/05/22 0536)  Pulse: (!) 120 (10/05/22 0545)  Resp: (!) 28 (10/05/22 0545)  BP: (!) 171/99 (10/05/22 0536)  SpO2: (!) 92 % (10/05/22 0536)  Weight: 60.1 kg (132 lb 9.6 oz) (10/05/22 0254)  Height: 5' 5" (165.1 cm) (10/05/22 0254)       Intubation    Date/Time: 10/5/2022 6:04 AM  Location procedure was performed: [facility] MEDICINE  Performed by: Giovanny Berumen MD  Authorized by: Giovanny Berumen MD   Consent Done: Emergent Situation  Indications: respiratory failure  Intubation method: direct  Preoxygenation: BVM  Laryngoscope size: Mac 4  Tube size: 7.5 mm  Tube type: cuffed  Number of attempts: 2  Ventilation between attempts: BVM  Cricoid pressure: yes  Cords visualized: yes  Post-procedure assessment: chest rise and CO2 detector  Cuff inflated: yes  ETT to teeth: 23 cm        10/5/2022    "

## 2022-10-05 NOTE — SIGNIFICANT EVENT
Responded to rapid response which subsequently became a code blue on the floor. Patient in PEA cardiac arrest. ROSC achieved after 2 rounds of epi and compression. Patient intubated and transferred to ICU. No family listed on demographics.

## 2022-10-05 NOTE — NURSING
"Visitors ([place]) that showed up for pt were his coworkers.  They stated that pt has a wife, but they did not have a name or phone number for her.  Pt's phone rang again prior to MICHAEL Rubi bringing pt belongings to patient relations c/o [facility].  Elicia answered the phone and spoke with a woman identifying herself as "Valentina," the pt's wife's sister (pt wife present on phone also, non-english speaking).  Elicia had Jj Newell NP, speak with them.      This RN then spoke with Valentina regarding calling [facility] [employer] once decision is made regarding  home service selection.  She was also informed that pt's clothing, phone, and wallet, would be securely kept in patient relations.  Family given phone number for Rashard Montgomery [phone]  "

## 2022-10-05 NOTE — PROGRESS NOTES
I spoke to 7 co-workers that arrived to ICU post death and explained patient's HPI and hospital course with resulting death and all questions answered.  Eventually spouse (does not speak English) reached in [place] and patient's sister-in-law was with her and called ICU and I spoke to sister-in-law with spouse at bed side and explained again hospital course and HPI with resulting death and offered condolences with all questions answered.  Co-workers and sister-in-law admitted patient smoked up to 2 ppd and did not ever follow with practitioner for primary care.      VICKY Newell [facility]

## 2022-11-16 NOTE — NURSING
Detail Level: Simple Pt transferred emergently to icu following s/p cardiac arrest on tele.  Pt connected to our central monitoring, ogt and ramirez dropped.  Orders for chest x ray and 12 lead. Vent mgmt per RT & MD.  Per core temp, pt mildly under 96 F.  Pt started to arouse, but not to command per Dr Berumen (Surinamese speaking).  Low dose prop started, levo on stand by at bedside s/t soft bps. Hep gtt infusing continued from tele.  Abg c lytes, results to MD via RT and 2 ivp of Sodium Bicarb ordered and given.  Pt tachy following 12 lead, 170s, with subsequent re-arrest. MD Berumen at bedside for code and eicu on monitor and documenting event.  See eicu code documentation.  Multiple episodes of arrest/lost pulse since this initial re-arrest in icu.  EICU has doc these.  No family or emergency contacts in epic to contact at this time. Janna in ED called for assistance locating pt family based on [place] in pt wallet.      A friend of pt called pt's cell phone.  MD Berumen spoke with friend, the friend is now on way to hospital. Concurrently,  is sending a unit to the home address on [place].      MICHAEL Rubi is here for dayshift and will assume care of this patient.    Additional Notes: Patient consent was obtained to proceed with the visit and recommended plan of care after discussion of all risks and benefits, including the risks of COVID-19 exposure. Render Risk Assessment In Note?: yes